# Patient Record
Sex: FEMALE | Race: WHITE | NOT HISPANIC OR LATINO | Employment: FULL TIME | URBAN - NONMETROPOLITAN AREA
[De-identification: names, ages, dates, MRNs, and addresses within clinical notes are randomized per-mention and may not be internally consistent; named-entity substitution may affect disease eponyms.]

---

## 2023-09-08 ENCOUNTER — OFFICE VISIT (OUTPATIENT)
Dept: FAMILY MEDICINE CLINIC | Facility: CLINIC | Age: 38
End: 2023-09-08
Payer: COMMERCIAL

## 2023-09-08 VITALS
TEMPERATURE: 97.1 F | OXYGEN SATURATION: 100 % | HEIGHT: 63 IN | HEART RATE: 68 BPM | SYSTOLIC BLOOD PRESSURE: 124 MMHG | BODY MASS INDEX: 21.26 KG/M2 | DIASTOLIC BLOOD PRESSURE: 76 MMHG | WEIGHT: 120 LBS

## 2023-09-08 DIAGNOSIS — F41.9 ANXIETY: Primary | ICD-10-CM

## 2023-09-08 DIAGNOSIS — Z00.00 GENERAL MEDICAL EXAM: ICD-10-CM

## 2023-09-08 DIAGNOSIS — N30.00 ACUTE CYSTITIS WITHOUT HEMATURIA: ICD-10-CM

## 2023-09-08 DIAGNOSIS — B37.31 VULVOVAGINAL CANDIDIASIS: ICD-10-CM

## 2023-09-08 LAB
BILIRUB BLD-MCNC: NEGATIVE MG/DL
CLARITY, POC: CLEAR
COLOR UR: YELLOW
EXPIRATION DATE: ABNORMAL
GLUCOSE UR STRIP-MCNC: NEGATIVE MG/DL
KETONES UR QL: NEGATIVE
LEUKOCYTE EST, POC: ABNORMAL
Lab: ABNORMAL
NITRITE UR-MCNC: NEGATIVE MG/ML
PH UR: 5.5 [PH] (ref 5–8)
PROT UR STRIP-MCNC: NEGATIVE MG/DL
RBC # UR STRIP: NEGATIVE /UL
SP GR UR: 1.01 (ref 1–1.03)
UROBILINOGEN UR QL: ABNORMAL

## 2023-09-08 PROCEDURE — 99214 OFFICE O/P EST MOD 30 MIN: CPT | Performed by: PHYSICIAN ASSISTANT

## 2023-09-08 PROCEDURE — 81003 URINALYSIS AUTO W/O SCOPE: CPT | Performed by: PHYSICIAN ASSISTANT

## 2023-09-08 PROCEDURE — 36415 COLL VENOUS BLD VENIPUNCTURE: CPT | Performed by: PHYSICIAN ASSISTANT

## 2023-09-08 PROCEDURE — 99385 PREV VISIT NEW AGE 18-39: CPT | Performed by: PHYSICIAN ASSISTANT

## 2023-09-08 RX ORDER — SPIRONOLACTONE 50 MG/1
TABLET, FILM COATED ORAL
COMMUNITY
Start: 2022-12-12

## 2023-09-08 RX ORDER — CEPHALEXIN 500 MG/1
500 CAPSULE ORAL 3 TIMES DAILY
Qty: 21 CAPSULE | Refills: 0 | Status: SHIPPED | OUTPATIENT
Start: 2023-09-08

## 2023-09-08 RX ORDER — FLUCONAZOLE 150 MG/1
150 TABLET ORAL DAILY
Qty: 2 TABLET | Refills: 0 | Status: SHIPPED | OUTPATIENT
Start: 2023-09-08 | End: 2023-09-10

## 2023-09-08 NOTE — PROGRESS NOTES
Female Physical Note      Date: 2023   Patient Name: Gabby Mesa  : 1985   MRN: [id number]     Chief Complaint:    Chief Complaint   Patient presents with    Anxiety    Establish Care       History of Present Illness: Gabby Mesa is a 37 y.o. female who is here today for their annual health maintenance and physical.      She complains having anxiety, which is chronic per patient history.  She states that she has been doing okay with her symptoms until recently, but she has had a lot of stress recently.  She admits feeling irritable and worrying too much.  She states that she has been on Zoloft in the past, and it has always helped her.  She denies any negative side effects of the medication.    She denies any urinary symptoms, but she admits some vaginal discharge.  She states that she thought she had a yeast infection, but she does think that it is getting better.  She states that she has had severe UTIs in the past without any urinary symptoms.    Subjective      Review of Systems:   Review of Systems   Constitutional:  Negative for activity change, appetite change and fatigue.   HENT: Negative.     Gastrointestinal: Negative.    Genitourinary:  Positive for vaginal discharge. Negative for decreased urine volume, difficulty urinating, dysuria, flank pain, frequency, genital sores, hematuria, pelvic pain, pelvic pressure, urgency and vaginal pain.   Musculoskeletal:  Positive for back pain.   Psychiatric/Behavioral:  Negative for decreased concentration, dysphoric mood, sleep disturbance and depressed mood. The patient is nervous/anxious.      Past Medical History, Social History, Family History and Care Team were all reviewed with patient and updated as appropriate.     Medications:     Current Outpatient Medications:     spironolactone (ALDACTONE) 50 MG tablet, , Disp: , Rfl:     cephalexin (Keflex) 500 MG capsule, Take 1 capsule by mouth 3 (Three) Times a Day., Disp: 21  "capsule, Rfl: 0    sertraline (Zoloft) 50 MG tablet, Take 1 tablet by mouth Daily., Disp: 30 tablet, Rfl: 0    Allergies:   Allergies   Allergen Reactions    Amoxicillin Hives and Rash       Immunizations:  Td/Tdap(Booster Q 10 yrs):  21  Flu (Yearly): She does not usually get flu shots.     Colorectal Screening:   She denies any family history of colon cancer, so she will start screenings at age 45.    Pap: She states that she had her pelvic exam at [facility] in late summer (July or early August)  Mammogram: She does not have any family history of breast cancer, so I recommend that she start her mammograms at age 40 per guidelines.      CT for Smoker (Age 55-75, 30pk yr): N/A  Bone Density/DEXA: .  N/A  Hep C ( 3911-7175): She has not had a screening that she is aware of, so we will check on today's labs.    Diet/Physical activity: She admits that her diet is not balanced.  She states that it is not terrible, but it is not as good as it should be.  It is somewhere in the middle.  She states that she eats most of her meals at home, but her biggest problem is snacking.    1 cup of coffee/1 Coke daily    She states that she is trying to get back into regular exercise after she had a knee injury.  She usually exercises 3-4 days a week with weight lifting and HIIT workouts.  She states that she also uses the elliptical sometimes.      Sexual Health: Denies concerns     PHQ-2 Depression Screening  Little interest or pleasure in doing things? 0-->not at all   Feeling down, depressed, or hopeless? 0-->not at all   PHQ-2 Total Score 0      Intimate partner violence: Denies concerns.      Objective     Physical Exam:  Vital Signs:   Vitals:    23 1438   BP: 124/76   Pulse: 68   Temp: 97.1 °F (36.2 °C)   SpO2: 100%   Weight: 54.4 kg (120 lb)   Height: 160 cm (63\")     Body mass index is 21.26 kg/m².     Physical Exam  Vitals and nursing note reviewed.   Constitutional:       General: She is not in acute " distress.     Appearance: Normal appearance. She is normal weight. She is not ill-appearing.   HENT:      Head: Normocephalic and atraumatic.   Cardiovascular:      Rate and Rhythm: Normal rate and regular rhythm.      Pulses: Normal pulses.      Heart sounds: Normal heart sounds.   Pulmonary:      Effort: Pulmonary effort is normal. No respiratory distress.      Breath sounds: Normal breath sounds.   Abdominal:      Tenderness: There is no right CVA tenderness or left CVA tenderness.   Musculoskeletal:      Right lower leg: No edema.      Left lower leg: No edema.   Skin:     General: Skin is warm and dry.   Neurological:      General: No focal deficit present.      Mental Status: She is alert and oriented to person, place, and time.      Coordination: Coordination normal.      Gait: Gait normal.   Psychiatric:         Mood and Affect: Mood normal.         Behavior: Behavior normal.       Results:  Brief Urine Lab Results  (Last result in the past 365 days)        Color   Clarity   Blood   Leuk Est   Nitrite   Protein   CREAT   Urine HCG        09/08/23 1528 Yellow   Clear   Negative   Small (1+)   Negative   Negative                 SUSI-7 total score: 6    Assessment / Plan      Assessment/Plan:   Diagnoses and all orders for this visit:    1. Anxiety (Primary)  Assessment & Plan:  Her anxiety is chronic and recurrent.  It is not currently controlled, but we will resume her sertraline since it is worked well for her in the past.     Orders:  -     sertraline (Zoloft) 50 MG tablet; Take 1 tablet by mouth Daily.  Dispense: 30 tablet; Refill: 0    2. General medical exam  -     CBC Auto Differential; Future  -     Comprehensive Metabolic Panel; Future  -     Lipid Panel; Future  -     Hemoglobin A1c; Future  -     Thyroid Cascade Profile; Future  -     Hepatitis C Antibody; Future  -     CBC Auto Differential  -     Comprehensive Metabolic Panel  -     Lipid Panel  -     Hemoglobin A1c  -     Thyroid Cascade  Profile  -     Hepatitis C Antibody    3. Acute cystitis without hematuria  Assessment & Plan:  Her urine screening does show some infection, so we will start antibiotics.  I will also send it for culture to confirm infection.    Orders:  -     POC Urinalysis Dipstick, Automated  -     Urine Culture - Urine, Urine, Clean Catch; Future  -     cephalexin (Keflex) 500 MG capsule; Take 1 capsule by mouth 3 (Three) Times a Day.  Dispense: 21 capsule; Refill: 0  -     Urine Culture - Urine, Urine, Clean Catch    4. Vulvovaginal candidiasis  Assessment & Plan:  She shows some symptoms of yeast infection, but we will give medication, especially due to antibiotic prescription today.    Orders:  -     fluconazole (DIFLUCAN) 150 MG tablet; Take 1 tablet by mouth Daily for 2 doses. On Days 1 and 3.  Dispense: 2 tablet; Refill: 0        Follow Up:   Return in about 4 weeks (around 10/6/2023) for recheck.    Healthcare Maintenance:   Discussed injury prevention, diet and exercise, safe sexual practices, and screening for common diseases. Encouraged use of sunscreen and seatbelts. Encouraged SBE, avoidance of tobacco, limiting alcohol, and yearly dental and eye exams.   Gabby Mesa voices understanding and acceptance of this advice and will call back with any further questions or concerns. AVS with preventive healthcare tips printed for patient.     Ashley Kerr PA-C  [facility] Internal Medicine [facility]

## 2023-09-09 LAB
ALBUMIN SERPL-MCNC: 5.1 G/DL (ref 3.9–4.9)
ALBUMIN/GLOB SERPL: 2 {RATIO} (ref 1.2–2.2)
ALP SERPL-CCNC: 84 IU/L (ref 44–121)
ALT SERPL-CCNC: 11 IU/L (ref 0–32)
AST SERPL-CCNC: 12 IU/L (ref 0–40)
BASOPHILS # BLD AUTO: 0.1 X10E3/UL (ref 0–0.2)
BASOPHILS NFR BLD AUTO: 1 %
BILIRUB SERPL-MCNC: 0.2 MG/DL (ref 0–1.2)
BUN SERPL-MCNC: 10 MG/DL (ref 6–20)
BUN/CREAT SERPL: 14 (ref 9–23)
CALCIUM SERPL-MCNC: 10.2 MG/DL (ref 8.7–10.2)
CHLORIDE SERPL-SCNC: 99 MMOL/L (ref 96–106)
CHOLEST SERPL-MCNC: 158 MG/DL (ref 100–199)
CO2 SERPL-SCNC: 24 MMOL/L (ref 20–29)
CREAT SERPL-MCNC: 0.7 MG/DL (ref 0.57–1)
EGFRCR SERPLBLD CKD-EPI 2021: 116 ML/MIN/1.73
EOSINOPHIL # BLD AUTO: 0.1 X10E3/UL (ref 0–0.4)
EOSINOPHIL NFR BLD AUTO: 1 %
ERYTHROCYTE [DISTWIDTH] IN BLOOD BY AUTOMATED COUNT: 11.9 % (ref 11.7–15.4)
GLOBULIN SER CALC-MCNC: 2.6 G/DL (ref 1.5–4.5)
GLUCOSE SERPL-MCNC: 81 MG/DL (ref 70–99)
HBA1C MFR BLD: 5.2 % (ref 4.8–5.6)
HCT VFR BLD AUTO: 40.6 % (ref 34–46.6)
HCV IGG SERPL QL IA: NON REACTIVE
HDLC SERPL-MCNC: 76 MG/DL
HGB BLD-MCNC: 13.7 G/DL (ref 11.1–15.9)
IMM GRANULOCYTES # BLD AUTO: 0 X10E3/UL (ref 0–0.1)
IMM GRANULOCYTES NFR BLD AUTO: 0 %
LDLC SERPL CALC-MCNC: 65 MG/DL (ref 0–99)
LYMPHOCYTES # BLD AUTO: 3.2 X10E3/UL (ref 0.7–3.1)
LYMPHOCYTES NFR BLD AUTO: 33 %
MCH RBC QN AUTO: 30.6 PG (ref 26.6–33)
MCHC RBC AUTO-ENTMCNC: 33.7 G/DL (ref 31.5–35.7)
MCV RBC AUTO: 91 FL (ref 79–97)
MONOCYTES # BLD AUTO: 0.9 X10E3/UL (ref 0.1–0.9)
MONOCYTES NFR BLD AUTO: 10 %
NEUTROPHILS # BLD AUTO: 5.3 X10E3/UL (ref 1.4–7)
NEUTROPHILS NFR BLD AUTO: 55 %
PLATELET # BLD AUTO: 271 X10E3/UL (ref 150–450)
POTASSIUM SERPL-SCNC: 4.1 MMOL/L (ref 3.5–5.2)
PROT SERPL-MCNC: 7.7 G/DL (ref 6–8.5)
RBC # BLD AUTO: 4.47 X10E6/UL (ref 3.77–5.28)
SODIUM SERPL-SCNC: 140 MMOL/L (ref 134–144)
TRIGL SERPL-MCNC: 92 MG/DL (ref 0–149)
TSH SERPL DL<=0.005 MIU/L-ACNC: 1.37 UIU/ML (ref 0.45–4.5)
VLDLC SERPL CALC-MCNC: 17 MG/DL (ref 5–40)
WBC # BLD AUTO: 9.6 X10E3/UL (ref 3.4–10.8)

## 2023-09-10 LAB
BACTERIA UR CULT: NORMAL
BACTERIA UR CULT: NORMAL

## 2023-09-11 PROBLEM — F41.9 ANXIETY: Status: ACTIVE | Noted: 2023-09-11

## 2023-09-11 PROBLEM — N30.00 ACUTE CYSTITIS WITHOUT HEMATURIA: Status: ACTIVE | Noted: 2023-09-11

## 2023-09-11 PROBLEM — B37.31 VULVOVAGINAL CANDIDIASIS: Status: ACTIVE | Noted: 2023-09-11

## 2023-09-12 NOTE — ASSESSMENT & PLAN NOTE
She shows some symptoms of yeast infection, but we will give medication, especially due to antibiotic prescription today.

## 2023-09-12 NOTE — ASSESSMENT & PLAN NOTE
Her anxiety is chronic and recurrent.  It is not currently controlled, but we will resume her sertraline since it is worked well for her in the past.

## 2023-09-12 NOTE — ASSESSMENT & PLAN NOTE
Her urine screening does show some infection, so we will start antibiotics.  I will also send it for culture to confirm infection.

## 2023-10-09 ENCOUNTER — OFFICE VISIT (OUTPATIENT)
Dept: FAMILY MEDICINE CLINIC | Facility: CLINIC | Age: 38
End: 2023-10-09
Payer: COMMERCIAL

## 2023-10-09 VITALS
WEIGHT: 121.9 LBS | HEART RATE: 79 BPM | BODY MASS INDEX: 21.6 KG/M2 | DIASTOLIC BLOOD PRESSURE: 72 MMHG | HEIGHT: 63 IN | OXYGEN SATURATION: 100 % | SYSTOLIC BLOOD PRESSURE: 112 MMHG

## 2023-10-09 DIAGNOSIS — F41.9 ANXIETY: Primary | ICD-10-CM

## 2023-10-09 PROCEDURE — 99214 OFFICE O/P EST MOD 30 MIN: CPT | Performed by: PHYSICIAN ASSISTANT

## 2023-10-09 RX ORDER — ESCITALOPRAM OXALATE 10 MG/1
10 TABLET ORAL DAILY
Qty: 30 TABLET | Refills: 0 | Status: SHIPPED | OUTPATIENT
Start: 2023-10-09

## 2023-10-09 NOTE — PROGRESS NOTES
Office Note     Name: Gabby Mesa    : 1985     MRN: [id number]     Chief Complaint  med recheck and Anxiety    Subjective     History of Present Illness:  Gabby Mesa is a 37 y.o. female who presents today for eval of anxiety, which is chronic per patient history.  She denies any acute complaints at this time, but she states that her sertraline is not helping.  She states that she did not see any benefits of the medication, but she did experience some side effects.  She states that it interfered with her sleep and made her feel jittery.  She states that that did not happen when she took it in the past.  She has also taken Celexa before, but it did not help her at all.  She has not taken any other medications herself.    Past Medical History:   Past Medical History:   Diagnosis Date    Heart murmur        Past Surgical History:   Past Surgical History:   Procedure Laterality Date    COSMETIC SURGERY  10/2011 (Breast Augmentation)    EYE SURGERY  Lasik 2015       Family History:   Family History   Problem Relation Age of Onset    Cancer Father         Prostate/ Other family members have passed away from Cancer due to smoking    Other Father         High Cholesterol       Social History:   Social History     Socioeconomic History    Marital status:    Tobacco Use    Smoking status: Never     Passive exposure: Never    Smokeless tobacco: Never   Vaping Use    Vaping Use: Never used   Substance and Sexual Activity    Alcohol use: Never    Drug use: Never    Sexual activity: Yes     Partners: Male     Birth control/protection: Vasectomy, Same-sex partner       Immunizations:   Immunization History   Administered Date(s) Administered    COVID-19 (MODERNA) 1st,2nd,3rd Dose Monovalent 2021, 2021    COVID-19 (MODERNA) Monovalent Original Booster 2022        Medications:     Current Outpatient Medications:     spironolactone (ALDACTONE) 50 MG tablet, , Disp: , Rfl:  "    escitalopram (Lexapro) 10 MG tablet, Take 1 tablet by mouth Daily., Disp: 30 tablet, Rfl: 0    Allergies:   Allergies   Allergen Reactions    Amoxicillin Hives and Rash       Objective     Vital Signs  /72   Pulse 79   Ht 160 cm (63\")   Wt 55.3 kg (121 lb 14.4 oz)   SpO2 100%   BMI 21.59 kg/mý   Estimated body mass index is 21.59 kg/mý as calculated from the following:    Height as of this encounter: 160 cm (63\").    Weight as of this encounter: 55.3 kg (121 lb 14.4 oz).    BMI is within normal parameters. No other follow-up for BMI required.      Physical Exam  Vitals and nursing note reviewed.   Constitutional:       General: She is not in acute distress.     Appearance: Normal appearance. She is normal weight. She is not ill-appearing.   HENT:      Head: Normocephalic and atraumatic.   Cardiovascular:      Rate and Rhythm: Normal rate and regular rhythm.      Pulses: Normal pulses.      Heart sounds: Normal heart sounds.   Pulmonary:      Effort: Pulmonary effort is normal. No respiratory distress.      Breath sounds: Normal breath sounds.   Abdominal:      Tenderness: There is no right CVA tenderness or left CVA tenderness.   Musculoskeletal:      Right lower leg: No edema.      Left lower leg: No edema.   Skin:     General: Skin is warm and dry.   Neurological:      General: No focal deficit present.      Mental Status: She is alert and oriented to person, place, and time.      Coordination: Coordination normal.      Gait: Gait normal.   Psychiatric:         Mood and Affect: Mood normal.         Behavior: Behavior normal.       Assessment and Plan     Assessment/Plan:  Diagnoses and all orders for this visit:    1. Anxiety (Primary)  Assessment & Plan:  Her anxiety is chronic and currently uncontrolled.  She did not see any benefits and only had negative side effects from the sertraline, so we will stop the medication and switch her to escitalopram daily.  She is in agreement with this plan, so I " will send the medication to the pharmacy.    Orders:  -     escitalopram (Lexapro) 10 MG tablet; Take 1 tablet by mouth Daily.  Dispense: 30 tablet; Refill: 0        Follow Up  Return in about 4 weeks (around 11/6/2023) for recheck.    Ashley Kerr PA-C  [facility] Internal Medicine [facility]

## 2023-10-09 NOTE — ASSESSMENT & PLAN NOTE
Her anxiety is chronic and currently uncontrolled.  She did not see any benefits and only had negative side effects from the sertraline, so we will stop the medication and switch her to escitalopram daily.  She is in agreement with this plan, so I will send the medication to the pharmacy.

## 2023-11-06 ENCOUNTER — OFFICE VISIT (OUTPATIENT)
Dept: FAMILY MEDICINE CLINIC | Facility: CLINIC | Age: 38
End: 2023-11-06
Payer: COMMERCIAL

## 2023-11-06 VITALS
DIASTOLIC BLOOD PRESSURE: 70 MMHG | OXYGEN SATURATION: 98 % | HEART RATE: 78 BPM | SYSTOLIC BLOOD PRESSURE: 100 MMHG | WEIGHT: 121.6 LBS | HEIGHT: 63 IN | BODY MASS INDEX: 21.55 KG/M2

## 2023-11-06 DIAGNOSIS — F41.9 ANXIETY: Primary | ICD-10-CM

## 2023-11-06 DIAGNOSIS — Z23 NEED FOR VACCINATION: ICD-10-CM

## 2023-11-06 PROCEDURE — 99213 OFFICE O/P EST LOW 20 MIN: CPT | Performed by: PHYSICIAN ASSISTANT

## 2023-11-06 PROCEDURE — 90471 IMMUNIZATION ADMIN: CPT | Performed by: PHYSICIAN ASSISTANT

## 2023-11-06 PROCEDURE — 90686 IIV4 VACC NO PRSV 0.5 ML IM: CPT | Performed by: PHYSICIAN ASSISTANT

## 2023-11-06 RX ORDER — ESCITALOPRAM OXALATE 10 MG/1
10 TABLET ORAL DAILY
Qty: 90 TABLET | Refills: 0 | Status: SHIPPED | OUTPATIENT
Start: 2023-11-06

## 2023-11-06 NOTE — PROGRESS NOTES
Office Note     Name: Gabby Mesa    : 1985     MRN: [id number]     Chief Complaint  Anxiety    Subjective     History of Present Illness:  Gabby Mesa is a 37 y.o. female who presents today for eval of anxiety, which is chronic per patient history.  She denies any acute complaints at this time.  She states that she has been doing really well on the Lexapro.  She denies any negative side effects, and she states that she has not been getting as upset since she has been taking the Lexapro.  She states that she is also not had that overwhelmed feeling that she always had before.  She states that her symptoms have significantly improved, and she would like to continue the current medication.    Past Medical History:   Past Medical History:   Diagnosis Date    Heart murmur        Past Surgical History:   Past Surgical History:   Procedure Laterality Date    COSMETIC SURGERY  10/2011 (Breast Augmentation)    EYE SURGERY  Lasik 2015       Family History:   Family History   Problem Relation Age of Onset    Cancer Father         Prostate/ Other family members have passed away from Cancer due to smoking    Other Father         High Cholesterol       Social History:   Social History     Socioeconomic History    Marital status:    Tobacco Use    Smoking status: Never     Passive exposure: Never    Smokeless tobacco: Never   Vaping Use    Vaping Use: Never used   Substance and Sexual Activity    Alcohol use: Never    Drug use: Never    Sexual activity: Yes     Partners: Male     Birth control/protection: Vasectomy, Same-sex partner       Immunizations:   Immunization History   Administered Date(s) Administered    COVID-19 (MODERNA) 1st,2nd,3rd Dose Monovalent 2021, 2021    COVID-19 (MODERNA) Monovalent Original Booster 2022    Fluzone (or Fluarix & Flulaval for VFC) >6mos 2023        Medications:     Current Outpatient Medications:     escitalopram (Lexapro) 10  "MG tablet, Take 1 tablet by mouth Daily., Disp: 90 tablet, Rfl: 0    spironolactone (ALDACTONE) 50 MG tablet, , Disp: , Rfl:     Allergies:   Allergies   Allergen Reactions    Amoxicillin Hives and Rash       Objective     Vital Signs  /70 (BP Location: Right arm, Patient Position: Sitting, Cuff Size: Adult)   Pulse 78   Ht 160 cm (63\")   Wt 55.2 kg (121 lb 9.6 oz)   SpO2 98%   BMI 21.54 kg/m²   Estimated body mass index is 21.54 kg/m² as calculated from the following:    Height as of this encounter: 160 cm (63\").    Weight as of this encounter: 55.2 kg (121 lb 9.6 oz).    BMI is within normal parameters. No other follow-up for BMI required.      Physical Exam  Vitals and nursing note reviewed.   Constitutional:       General: She is not in acute distress.     Appearance: Normal appearance. She is normal weight. She is not ill-appearing.   HENT:      Head: Normocephalic and atraumatic.   Cardiovascular:      Rate and Rhythm: Normal rate and regular rhythm.      Pulses: Normal pulses.      Heart sounds: Normal heart sounds.   Pulmonary:      Effort: Pulmonary effort is normal. No respiratory distress.      Breath sounds: Normal breath sounds.   Abdominal:      Tenderness: There is no right CVA tenderness or left CVA tenderness.   Musculoskeletal:      Right lower leg: No edema.      Left lower leg: No edema.   Skin:     General: Skin is warm and dry.   Neurological:      General: No focal deficit present.      Mental Status: She is alert and oriented to person, place, and time.      Coordination: Coordination normal.      Gait: Gait normal.   Psychiatric:         Mood and Affect: Mood normal.         Behavior: Behavior normal.        Assessment and Plan     Assessment/Plan:  Diagnoses and all orders for this visit:    1. Anxiety (Primary)  Assessment & Plan:  Her anxiety is chronic and currently controlled on the Lexapro.  We will continue as prescribed and reevaluate on the next visit.  She is in " agreement with this plan.    Orders:  -     escitalopram (Lexapro) 10 MG tablet; Take 1 tablet by mouth Daily.  Dispense: 90 tablet; Refill: 0    2. Need for vaccination  -     Fluzone >6 Months ([id number])        Follow Up  Return in about 3 months (around 2/6/2024) for next scheduled follow up.    Ashley Kerr PA-C  [facility] Internal Medicine [facility]

## 2023-11-07 NOTE — ASSESSMENT & PLAN NOTE
Her anxiety is chronic and currently controlled on the Lexapro.  We will continue as prescribed and reevaluate on the next visit.  She is in agreement with this plan.

## 2024-02-06 ENCOUNTER — OFFICE VISIT (OUTPATIENT)
Dept: FAMILY MEDICINE CLINIC | Facility: CLINIC | Age: 39
End: 2024-02-06
Payer: COMMERCIAL

## 2024-02-06 VITALS
SYSTOLIC BLOOD PRESSURE: 122 MMHG | HEIGHT: 63 IN | OXYGEN SATURATION: 99 % | HEART RATE: 72 BPM | WEIGHT: 122.6 LBS | BODY MASS INDEX: 21.72 KG/M2 | DIASTOLIC BLOOD PRESSURE: 70 MMHG

## 2024-02-06 DIAGNOSIS — F41.9 ANXIETY: ICD-10-CM

## 2024-02-06 PROCEDURE — 99213 OFFICE O/P EST LOW 20 MIN: CPT | Performed by: PHYSICIAN ASSISTANT

## 2024-02-06 RX ORDER — ESCITALOPRAM OXALATE 10 MG/1
10 TABLET ORAL DAILY
Qty: 90 TABLET | Refills: 2 | Status: SHIPPED | OUTPATIENT
Start: 2024-02-06

## 2024-02-06 NOTE — PROGRESS NOTES
Office Note     Name: Gabby Mesa    : 1985     MRN: [id number]     Chief Complaint  Anxiety    Subjective     History of Present Illness:  Gabby Mesa is a 38 y.o. female who presents today for eval of anxiety, which is chronic per patient history.  She states that she feels like the escitalopram has been working very well.  She denies any negative side effects.  She states that she feels like the medication is helpng her stay calm and not get as overwhelmed as she could easily get her busy life.  She does not want make any changes at time.        Past Medical History:   Past Medical History:   Diagnosis Date    Heart murmur        Past Surgical History:   Past Surgical History:   Procedure Laterality Date    COSMETIC SURGERY  10/2011 (Breast Augmentation)    EYE SURGERY  Lasik 2015       Family History:   Family History   Problem Relation Age of Onset    Cancer Father         Prostate/ Other family members have passed away from Cancer due to smoking    Other Father         High Cholesterol       Social History:   Social History     Socioeconomic History    Marital status:    Tobacco Use    Smoking status: Never     Passive exposure: Never    Smokeless tobacco: Never   Vaping Use    Vaping Use: Never used   Substance and Sexual Activity    Alcohol use: Never    Drug use: Never    Sexual activity: Yes     Partners: Male     Birth control/protection: Vasectomy, Same-sex partner       Immunizations:   Immunization History   Administered Date(s) Administered    31-influenza Vac Quardvalent Preservativ 10/04/2017    COVID-19 (MODERNA) 1st,2nd,3rd Dose Monovalent 2021, 2021    COVID-19 (MODERNA) Monovalent Original Booster 2022    DTaP 5 2016    Fluzone (or Fluarix & Flulaval for VFC) >6mos 2023    Tdap 2021        Medications:     Current Outpatient Medications:     escitalopram (Lexapro) 10 MG tablet, Take 1 tablet by mouth Daily., Disp: 90  "tablet, Rfl: 2    spironolactone (ALDACTONE) 50 MG tablet, , Disp: , Rfl:     Allergies:   Allergies   Allergen Reactions    Amoxicillin Hives and Rash       Objective     Vital Signs  /70 (BP Location: Right arm, Patient Position: Sitting, Cuff Size: Adult)   Pulse 72   Ht 160 cm (63\")   Wt 55.6 kg (122 lb 9.6 oz)   SpO2 99%   BMI 21.72 kg/m²   Estimated body mass index is 21.72 kg/m² as calculated from the following:    Height as of this encounter: 160 cm (63\").    Weight as of this encounter: 55.6 kg (122 lb 9.6 oz).    BMI is within normal parameters. No other follow-up for BMI required.      Physical Exam  Vitals and nursing note reviewed.   Constitutional:       General: She is not in acute distress.     Appearance: Normal appearance. She is normal weight. She is not ill-appearing.   HENT:      Head: Normocephalic and atraumatic.   Cardiovascular:      Rate and Rhythm: Normal rate and regular rhythm.      Pulses: Normal pulses.      Heart sounds: Normal heart sounds.   Pulmonary:      Effort: Pulmonary effort is normal. No respiratory distress.      Breath sounds: Normal breath sounds.   Abdominal:      Tenderness: There is no right CVA tenderness or left CVA tenderness.   Musculoskeletal:      Right lower leg: No edema.      Left lower leg: No edema.   Skin:     General: Skin is warm and dry.   Neurological:      General: No focal deficit present.      Mental Status: She is alert and oriented to person, place, and time.      Coordination: Coordination normal.      Gait: Gait normal.   Psychiatric:         Mood and Affect: Mood normal.         Behavior: Behavior normal.          Results:  SUSI-7      Over the last two weeks, how often have you been bothered by the following problems?  Feeling nervous, anxious or on edge: Several days  Not being able to stop or control worrying: Not at all  Worrying too much about different things: Not at all  Trouble Relaxing: Not at all  Being so restless that it is " hard to sit still: Not at all  Becoming easily annoyed or irritable: Several days  Feeling afraid as if something awful might happen: Several days  SUSI 7 Total Score: 3  If you checked any problems, how difficult have these problems made it for you to do your work, take care of things at home, or get along with other people: Not difficult at all    Assessment and Plan     Assessment/Plan:  Diagnoses and all orders for this visit:    1. Anxiety  Assessment & Plan:  Her anxiety is chronic and currently stable on the Lexapro.  We will continue as prescribed and re-evaluate on the next visit for her annual physical.  She is in agreement with this plan.     Orders:  -     escitalopram (Lexapro) 10 MG tablet; Take 1 tablet by mouth Daily.  Dispense: 90 tablet; Refill: 2        Follow Up  Return in about 7 months (around 9/6/2024) for Annual Physical.    Ashley Kerr PA-C  [facility] Internal Medicine [facility]

## 2024-02-07 NOTE — ASSESSMENT & PLAN NOTE
Her anxiety is chronic and currently stable on the Lexapro.  We will continue as prescribed and re-evaluate on the next visit for her annual physical.  She is in agreement with this plan.

## 2024-05-10 ENCOUNTER — OFFICE VISIT (OUTPATIENT)
Dept: FAMILY MEDICINE CLINIC | Facility: CLINIC | Age: 39
End: 2024-05-10
Payer: COMMERCIAL

## 2024-05-10 VITALS
HEIGHT: 63 IN | HEART RATE: 54 BPM | BODY MASS INDEX: 22.68 KG/M2 | SYSTOLIC BLOOD PRESSURE: 130 MMHG | OXYGEN SATURATION: 99 % | WEIGHT: 128 LBS | DIASTOLIC BLOOD PRESSURE: 80 MMHG

## 2024-05-10 DIAGNOSIS — N91.2 AMENORRHEA: ICD-10-CM

## 2024-05-10 DIAGNOSIS — R53.82 CHRONIC FATIGUE: Primary | ICD-10-CM

## 2024-05-10 DIAGNOSIS — N94.10 DYSPAREUNIA IN FEMALE: ICD-10-CM

## 2024-05-10 LAB
B-HCG UR QL: NEGATIVE
EXPIRATION DATE: NORMAL
INTERNAL NEGATIVE CONTROL: NORMAL
INTERNAL POSITIVE CONTROL: NORMAL
Lab: NORMAL

## 2024-05-10 PROCEDURE — 81025 URINE PREGNANCY TEST: CPT | Performed by: PHYSICIAN ASSISTANT

## 2024-05-10 PROCEDURE — 36415 COLL VENOUS BLD VENIPUNCTURE: CPT | Performed by: PHYSICIAN ASSISTANT

## 2024-05-10 PROCEDURE — 99214 OFFICE O/P EST MOD 30 MIN: CPT | Performed by: PHYSICIAN ASSISTANT

## 2024-05-11 LAB
25(OH)D3+25(OH)D2 SERPL-MCNC: 34.7 NG/ML (ref 30–100)
ALBUMIN SERPL-MCNC: 4.8 G/DL (ref 3.9–4.9)
ALBUMIN/GLOB SERPL: 1.7 {RATIO} (ref 1.2–2.2)
ALP SERPL-CCNC: 83 IU/L (ref 44–121)
ALT SERPL-CCNC: 12 IU/L (ref 0–32)
AST SERPL-CCNC: 17 IU/L (ref 0–40)
BASOPHILS # BLD AUTO: 0.1 X10E3/UL (ref 0–0.2)
BASOPHILS NFR BLD AUTO: 1 %
BILIRUB SERPL-MCNC: <0.2 MG/DL (ref 0–1.2)
BUN SERPL-MCNC: 11 MG/DL (ref 6–20)
BUN/CREAT SERPL: 14 (ref 9–23)
CALCIUM SERPL-MCNC: 9.7 MG/DL (ref 8.7–10.2)
CHLORIDE SERPL-SCNC: 99 MMOL/L (ref 96–106)
CO2 SERPL-SCNC: 24 MMOL/L (ref 20–29)
CREAT SERPL-MCNC: 0.77 MG/DL (ref 0.57–1)
EGFRCR SERPLBLD CKD-EPI 2021: 102 ML/MIN/1.73
EOSINOPHIL # BLD AUTO: 0.1 X10E3/UL (ref 0–0.4)
EOSINOPHIL NFR BLD AUTO: 1 %
ERYTHROCYTE [DISTWIDTH] IN BLOOD BY AUTOMATED COUNT: 12.7 % (ref 11.7–15.4)
FERRITIN SERPL-MCNC: 48 NG/ML (ref 15–150)
GLOBULIN SER CALC-MCNC: 2.8 G/DL (ref 1.5–4.5)
GLUCOSE SERPL-MCNC: 86 MG/DL (ref 70–99)
HBA1C MFR BLD: 5.1 % (ref 4.8–5.6)
HCT VFR BLD AUTO: 43 % (ref 34–46.6)
HGB BLD-MCNC: 13.7 G/DL (ref 11.1–15.9)
IMM GRANULOCYTES # BLD AUTO: 0 X10E3/UL (ref 0–0.1)
IMM GRANULOCYTES NFR BLD AUTO: 0 %
IRON SERPL-MCNC: 54 UG/DL (ref 27–159)
LYMPHOCYTES # BLD AUTO: 3 X10E3/UL (ref 0.7–3.1)
LYMPHOCYTES NFR BLD AUTO: 32 %
MCH RBC QN AUTO: 29.7 PG (ref 26.6–33)
MCHC RBC AUTO-ENTMCNC: 31.9 G/DL (ref 31.5–35.7)
MCV RBC AUTO: 93 FL (ref 79–97)
MONOCYTES # BLD AUTO: 0.9 X10E3/UL (ref 0.1–0.9)
MONOCYTES NFR BLD AUTO: 10 %
NEUTROPHILS # BLD AUTO: 5.4 X10E3/UL (ref 1.4–7)
NEUTROPHILS NFR BLD AUTO: 56 %
PLATELET # BLD AUTO: 326 X10E3/UL (ref 150–450)
POTASSIUM SERPL-SCNC: 4.5 MMOL/L (ref 3.5–5.2)
PROT SERPL-MCNC: 7.6 G/DL (ref 6–8.5)
RBC # BLD AUTO: 4.61 X10E6/UL (ref 3.77–5.28)
SODIUM SERPL-SCNC: 138 MMOL/L (ref 134–144)
TSH SERPL DL<=0.005 MIU/L-ACNC: 1.35 UIU/ML (ref 0.45–4.5)
VIT B12 SERPL-MCNC: 358 PG/ML (ref 232–1245)
WBC # BLD AUTO: 9.5 X10E3/UL (ref 3.4–10.8)

## 2024-05-17 PROBLEM — N94.10 DYSPAREUNIA IN FEMALE: Status: ACTIVE | Noted: 2024-05-17

## 2024-05-17 NOTE — ASSESSMENT & PLAN NOTE
Will get labs this date.  Advised patient if labs come back normal then this could be secondary to mood and she needs to return to clinic for further follow-up.

## 2024-05-17 NOTE — ASSESSMENT & PLAN NOTE
Patient urine pregnancy negative.  Advised patient to follow-up with gynecology secondary to irregularity of menstrual cycles.  Advised patient she should not go over 90 days without a cycle.

## 2024-05-17 NOTE — ASSESSMENT & PLAN NOTE
This is a chronic issue, advised patient to schedule follow-up with gynecology.  Patient likely needs transvaginal ultrasound.

## 2024-05-17 NOTE — PROGRESS NOTES
"Chief Complaint  Fatigue (Last couple months fatigue has been worse ), Menstrual Problem (Having irregular periods ), and Sexual Problem (Having pain during sex near ovaries on left side )    Subjective        Gabby Mesa presents to [facility]  History of Present Illness  Patient reports today to discuss a few issues.  Patient reports for the past few months she has been feeling fatigue.  Reports gradually it has becoming worse and she wants to sleep throughout the day.  Patient denies snoring.  Patient reports she does not feel that she is having increased depression.    Patient reports her menstrual cycles have not been regular.  Patient states she has not missed several months.    Patient reports for at least the past 6 months she has been having pain during sex.  Reports it is mainly on 1 side and feels like it could be ovarian pain.  Patient denies any associated bleeding  Fatigue  Associated symptoms include fatigue.   Menstrual Problem  Associated symptoms include fatigue.       Objective   Vital Signs:  /80   Pulse 54   Ht 160 cm (63\")   Wt 58.1 kg (128 lb)   SpO2 99%   BMI 22.67 kg/m²   Estimated body mass index is 22.67 kg/m² as calculated from the following:    Height as of this encounter: 160 cm (63\").    Weight as of this encounter: 58.1 kg (128 lb).       BMI is within normal parameters. No other follow-up for BMI required.      Physical Exam  Vitals and nursing note reviewed.   Constitutional:       General: She is not in acute distress.     Appearance: Normal appearance.   HENT:      Head: Normocephalic.      Right Ear: Hearing normal.      Left Ear: Hearing normal.   Eyes:      Pupils: Pupils are equal, round, and reactive to light.   Cardiovascular:      Rate and Rhythm: Normal rate and regular rhythm.   Pulmonary:      Effort: Pulmonary effort is normal. No respiratory distress.   Skin:     General: Skin is warm and dry.   Neurological:      " Mental Status: She is alert.   Psychiatric:         Mood and Affect: Mood normal.        Result Review :                     Assessment and Plan     Diagnoses and all orders for this visit:    1. Chronic fatigue (Primary)  Assessment & Plan:  Will get labs this date.    Orders:  -     Hemoglobin A1c; Future  -     CBC & Differential; Future  -     Comprehensive Metabolic Panel; Future  -     TSH; Future  -     Vitamin B12; Future  -     Vitamin D,25-Hydroxy; Future  -     Iron; Future  -     Ferritin; Future  -     Hemoglobin A1c  -     CBC & Differential  -     Comprehensive Metabolic Panel  -     TSH  -     Vitamin B12  -     Vitamin D,25-Hydroxy  -     Iron  -     Ferritin    2. Amenorrhea  Assessment & Plan:  Patient urine pregnancy negative.  Advised patient to follow-up with gynecology secondary to irregularity of menstrual cycles.  Advised patient she should not go over 90 days without a cycle.    Orders:  -     POC Pregnancy, Urine    3. Dyspareunia in female  Assessment & Plan:  This is a chronic issue, advised patient to schedule follow-up with gynecology.  Patient likely needs transvaginal ultrasound.               Follow Up     Return if symptoms worsen or fail to improve.  Patient was given instructions and counseling regarding her condition or for health maintenance advice. Please see specific information pulled into the AVS if appropriate.

## 2024-07-30 DIAGNOSIS — F41.9 ANXIETY: ICD-10-CM

## 2024-07-31 RX ORDER — ESCITALOPRAM OXALATE 10 MG/1
10 TABLET ORAL DAILY
Qty: 90 TABLET | Refills: 2 | Status: SHIPPED | OUTPATIENT
Start: 2024-07-31

## 2025-01-24 ENCOUNTER — OFFICE VISIT (OUTPATIENT)
Dept: FAMILY MEDICINE CLINIC | Facility: CLINIC | Age: 40
End: 2025-01-24
Payer: COMMERCIAL

## 2025-01-24 VITALS
WEIGHT: 131.5 LBS | OXYGEN SATURATION: 97 % | TEMPERATURE: 98.2 F | DIASTOLIC BLOOD PRESSURE: 80 MMHG | BODY MASS INDEX: 23.3 KG/M2 | HEART RATE: 77 BPM | SYSTOLIC BLOOD PRESSURE: 110 MMHG | HEIGHT: 63 IN

## 2025-01-24 DIAGNOSIS — F41.9 ANXIETY: Chronic | ICD-10-CM

## 2025-01-24 DIAGNOSIS — Z00.00 GENERAL MEDICAL EXAM: Primary | ICD-10-CM

## 2025-01-24 PROCEDURE — 99395 PREV VISIT EST AGE 18-39: CPT | Performed by: PHYSICIAN ASSISTANT

## 2025-01-24 NOTE — PROGRESS NOTES
Female Physical Note      Date: 2025   Patient Name: Gabby Mesa  : 1985   MRN: [id number]     Chief Complaint:    Chief Complaint   Patient presents with    Annual Exam       History of Present Illness: Gabby Mesa is a 39 y.o. female who is here today for their annual health maintenance and physical.      The patient presents for anxiety and health maintenance.    She has tapered off of her Lexapro and has managed her anxiety without medication for 3 months, returning to baseline with no withdrawal symptoms.    She maintains an active lifestyle with strength training 5 days a week and uses an elliptical machine, though she dislikes cardiovascular activities. Her diet includes Coke Zero, one cup of coffee daily, less processed foods, yogurt, and protein-rich meals.     She recently had a pelvic exam at [facility], with a Pap smear scheduled for next year.     She declined the influenza vaccine but is current with her tetanus vaccination.     Her last blood work was in May 2024.     She reports no fatigue, abdominal pain, changes in bowel movements, urinary frequency, chest pain, or shortness of breath. She experiences morning throat dryness due to a dry home environment, relieved by hot coffee, and expects it to persist until warmer weather. She reports no ear issues, sore throats, or difficulty swallowing.     She is up-to-date with dental exams, having had a cleaning 2 weeks ago, but needs an eye exam.    SOCIAL HISTORY  - Occupation: Teacher    Subjective      Review of Systems:   Review of Systems   Constitutional:  Negative for activity change, appetite change, fatigue, fever, unexpected weight gain and unexpected weight loss.   HENT:  Negative for congestion, ear discharge, ear pain, postnasal drip, rhinorrhea, sinus pressure, sneezing, sore throat, trouble swallowing and voice change.    Eyes:  Negative for blurred vision, double vision, photophobia, pain, itching and  visual disturbance.   Respiratory:  Negative for apnea, cough, chest tightness, shortness of breath and wheezing.    Cardiovascular:  Negative for chest pain, palpitations and leg swelling.   Gastrointestinal:  Negative for abdominal pain, blood in stool, constipation, diarrhea, nausea, vomiting and GERD.   Endocrine: Negative for cold intolerance, heat intolerance, polydipsia and polyuria.   Genitourinary:  Negative for decreased urine volume, difficulty urinating, dysuria, flank pain, frequency, hematuria and urinary incontinence.   Musculoskeletal:  Negative for arthralgias, back pain, gait problem, joint swelling and myalgias.   Skin:  Negative for rash, skin lesions and wound.   Allergic/Immunologic: Negative for environmental allergies and food allergies.   Neurological:  Negative for dizziness, syncope, weakness, light-headedness, numbness and headache.   Hematological:  Negative for adenopathy. Does not bruise/bleed easily.   Psychiatric/Behavioral:  Negative for decreased concentration, dysphoric mood, sleep disturbance, depressed mood and stress. The patient is not nervous/anxious.        Past Medical History, Social History, Family History and Care Team were all reviewed with patient and updated as appropriate.     Medications:     Current Outpatient Medications:     spironolactone (ALDACTONE) 50 MG tablet, , Disp: , Rfl:     Allergies:   Allergies   Allergen Reactions    Amoxicillin Hives and Rash       Health Maintenance   Topic Date Due    PAP SMEAR  Never done    ANNUAL PHYSICAL  09/08/2024    INFLUENZA VACCINE  03/31/2025 (Originally 7/1/2024)    COVID-19 Vaccine (4 - 2024-25 season) 04/15/2025 (Originally 9/1/2024)    TDAP/TD VACCINES (2 - Td or Tdap) 05/21/2031    HEPATITIS C SCREENING  Completed    Pneumococcal Vaccine 0-64  Aged Out     Objective     Physical Exam:  Vital Signs:   Vitals:    01/24/25 0759   BP: 110/80   Pulse: 77   Temp: 98.2 °F (36.8 °C)   SpO2: 97%   Weight: 59.6 kg (131 lb 8  "oz)   Height: 160 cm (63\")   PainSc: 0-No pain     Body mass index is 23.29 kg/m².     Physical Exam  Vitals and nursing note reviewed.   Constitutional:       General: She is not in acute distress.     Appearance: Normal appearance. She is not ill-appearing.   HENT:      Head: Normocephalic and atraumatic.      Right Ear: Tympanic membrane, ear canal and external ear normal.      Left Ear: Tympanic membrane, ear canal and external ear normal.      Nose: Nose normal.      Mouth/Throat:      Mouth: Mucous membranes are moist.      Pharynx: Oropharynx is clear. No oropharyngeal exudate or posterior oropharyngeal erythema.   Eyes:      Extraocular Movements: Extraocular movements intact.      Conjunctiva/sclera: Conjunctivae normal.      Pupils: Pupils are equal, round, and reactive to light.   Cardiovascular:      Rate and Rhythm: Normal rate and regular rhythm.      Heart sounds: Normal heart sounds.   Pulmonary:      Effort: Pulmonary effort is normal.      Breath sounds: Normal breath sounds.   Abdominal:      General: Bowel sounds are normal.      Palpations: Abdomen is soft. There is no mass.      Tenderness: There is no abdominal tenderness. There is no right CVA tenderness, left CVA tenderness or guarding.      Hernia: No hernia is present.   Musculoskeletal:      Cervical back: Normal range of motion and neck supple.      Right lower leg: No edema.      Left lower leg: No edema.   Lymphadenopathy:      Cervical: No cervical adenopathy.   Skin:     General: Skin is warm.   Neurological:      General: No focal deficit present.      Mental Status: She is alert and oriented to person, place, and time.      Motor: No weakness.      Coordination: Coordination normal.      Gait: Gait normal.   Psychiatric:         Mood and Affect: Mood normal.         Behavior: Behavior normal.         Assessment / Plan      Assessment/Plan:   Diagnoses and all orders for this visit:    1. General medical exam " (Primary)  Comments:  Benefits of immunizations and preventative screenings discussed with the patient and encouraged.    2. Anxiety  Assessment & Plan:  Anxiety is chronic and stable without medication.  She has been managing it with lifestyle.          Follow Up:   Return in about 1 year (around 1/24/2026) for Annual Physical.    Healthcare Maintenance:   Discussed injury prevention, diet and exercise, safe sexual practices, and screening for common diseases. Encouraged use of sunscreen and seatbelts. Encouraged SBE, avoidance of tobacco, limiting alcohol, and yearly dental and eye exams.   Gabby Mesa voices understanding and acceptance of this advice and will call back with any further questions or concerns. AVS with preventive healthcare tips printed for patient.     Ashley Kerr PA-C  [facility]